# Patient Record
Sex: MALE | Race: OTHER
[De-identification: names, ages, dates, MRNs, and addresses within clinical notes are randomized per-mention and may not be internally consistent; named-entity substitution may affect disease eponyms.]

---

## 2020-08-04 ENCOUNTER — HOSPITAL ENCOUNTER (EMERGENCY)
Dept: HOSPITAL 54 - ER | Age: 35
Discharge: HOME | End: 2020-08-04
Payer: COMMERCIAL

## 2020-08-04 VITALS — HEIGHT: 72 IN | BODY MASS INDEX: 30.48 KG/M2 | WEIGHT: 225 LBS

## 2020-08-04 VITALS — SYSTOLIC BLOOD PRESSURE: 127 MMHG | DIASTOLIC BLOOD PRESSURE: 85 MMHG
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
## 2020-08-04 DIAGNOSIS — S63.591A: Primary | ICD-10-CM
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
## 2020-08-04 DIAGNOSIS — Z88.2: ICD-10-CM
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
## 2020-08-04 PROCEDURE — 96372 THER/PROPH/DIAG INJ SC/IM: CPT

## 2020-08-04 PROCEDURE — 73110 X-RAY EXAM OF WRIST: CPT

## 2020-08-04 PROCEDURE — 99283 EMERGENCY DEPT VISIT LOW MDM: CPT

## 2020-08-04 PROCEDURE — 29125 APPL SHORT ARM SPLINT STATIC: CPT

## 2020-08-04 NOTE — NUR
PT RECEIVED R WRIST SPLINT AND ACE WRAP. MEDICALLY STABLE FOR D/C.Patient 
discharged to home in stable condition. Rx and Written and verbal after care 
instructions given. Patient verbalizes understanding of instruction.
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
## 2020-12-13 ENCOUNTER — HOSPITAL ENCOUNTER (EMERGENCY)
Dept: HOSPITAL 54 - ER | Age: 35
Discharge: HOME | End: 2020-12-13
Payer: COMMERCIAL

## 2020-12-13 VITALS — WEIGHT: 220 LBS | BODY MASS INDEX: 29.8 KG/M2 | HEIGHT: 72 IN

## 2020-12-13 VITALS — DIASTOLIC BLOOD PRESSURE: 77 MMHG | SYSTOLIC BLOOD PRESSURE: 112 MMHG

## 2020-12-13 DIAGNOSIS — Z88.2: ICD-10-CM
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
## 2020-12-13 DIAGNOSIS — S05.01XA: Primary | ICD-10-CM
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
